# Patient Record
Sex: FEMALE | Race: WHITE | URBAN - METROPOLITAN AREA
[De-identification: names, ages, dates, MRNs, and addresses within clinical notes are randomized per-mention and may not be internally consistent; named-entity substitution may affect disease eponyms.]

---

## 2017-05-29 ENCOUNTER — OFFICE VISIT (OUTPATIENT)
Dept: URGENT CARE | Facility: URGENT CARE | Age: 57
End: 2017-05-29
Payer: COMMERCIAL

## 2017-05-29 VITALS
SYSTOLIC BLOOD PRESSURE: 142 MMHG | WEIGHT: 165.4 LBS | HEART RATE: 97 BPM | OXYGEN SATURATION: 97 % | DIASTOLIC BLOOD PRESSURE: 92 MMHG | TEMPERATURE: 98 F

## 2017-05-29 DIAGNOSIS — M54.50 ACUTE MIDLINE LOW BACK PAIN WITHOUT SCIATICA: Primary | ICD-10-CM

## 2017-05-29 DIAGNOSIS — R03.0 ELEVATED BLOOD PRESSURE READING WITHOUT DIAGNOSIS OF HYPERTENSION: ICD-10-CM

## 2017-05-29 PROCEDURE — 99204 OFFICE O/P NEW MOD 45 MIN: CPT | Performed by: NURSE PRACTITIONER

## 2017-05-29 RX ORDER — HYDROCODONE BITARTRATE AND ACETAMINOPHEN 5; 325 MG/1; MG/1
1 TABLET ORAL EVERY 8 HOURS PRN
Qty: 10 TABLET | Refills: 0 | Status: SHIPPED | OUTPATIENT
Start: 2017-05-29 | End: 2018-01-21

## 2017-05-29 RX ORDER — LISINOPRIL 10 MG/1
TABLET ORAL
Refills: 2 | COMMUNITY
Start: 2017-05-10

## 2017-05-29 RX ORDER — HYDROCODONE BITARTRATE AND ACETAMINOPHEN 5; 325 MG/1; MG/1
TABLET ORAL
Refills: 0 | COMMUNITY
Start: 2017-05-17 | End: 2017-05-29

## 2017-05-29 ASSESSMENT — PAIN SCALES - GENERAL: PAINLEVEL: WORST PAIN (10)

## 2017-05-29 NOTE — NURSING NOTE
Chief Complaint   Patient presents with     Back Pain       Initial BP (!) 160/120 (BP Location: Left arm, Patient Position: Chair, Cuff Size: Adult Regular)  Pulse 97  Temp 98  F (36.7  C) (Oral)  Wt 165 lb 6.4 oz (75 kg)  SpO2 97% There is no height or weight on file to calculate BMI.  Medication Reconciliation: complete     Gina Peña, CMA

## 2017-05-29 NOTE — MR AVS SNAPSHOT
"              After Visit Summary   5/29/2017    Yumiko Martines    MRN: [id number]           Patient Information     Date Of Birth          1960        Visit Information        Provider Department      5/29/2017 4:55 PM Lily Lambert APRN [facility]        Today's Diagnoses     Acute midline low back pain with sciatica    -  1      Care Instructions      * Sciatica    Sciatica (\"Lumbar Radiculopathy\") causes a pain that spreads from the lower back down into the buttock, hip and leg. Sometimes leg pain can occur without any back pain. Sciatica is due to irritation or pressure on a spinal nerve as it comes out of the spinal canal. This is most often due to a bulge or rupture of a nearby spinal disk (the cartilage cushion between each spinal bone), which presses on a nearby nerve. Other causes include spinal stenosis (narrowing of the spinal canal) and spasm of the piriformis muscle (a muscle in the buttocks that the sciatic nerve passes through).  Sciatica may begin after a sudden twisting/bending force (such as in a car accident), or sometimes after a simple awkward movement. In either case, muscle spasm is commonly present and contributes to the pain.  The diagnosis of sciatica is made from the symptoms and physical exam. Unless you had a physical injury (such as a car accident or fall), X-rays are usually not ordered for the initial evaluation of sciatica because the nerves and disks cannot be seen on an X-ray. Most sciatica (80-90%) gets better with time.  What can I do about my low back pain?  There are three main things you can do to ease low back pain and help it go away.    Use heat or cold packs.    Take medicine as directed.    Use positions, movements and exercises. Stay active! Too much rest can make your symptoms worse.  Using heat or cold packs  Try cold packs or gentle heat to ease your pain. Use whichever gives the most relief. Apply the cold pack or heat for 15 " minutes at a time, as often as needed.  Taking medicine  If taking over-the-counter medicine:    Take ibuprofen (Advil, Motrin) 600 mg. three times a day as needed for pain.  OR    Take Aleve (naproxen sodium) 220 to 440 mg. two times a day as needed for pain  If your doctor prescribed a muscle relaxant (cyclobenzapine 10 mg.):    Take one half ( ) to 1 tablet at bedtime    Do not drive when taking this medicine. This drug may make you sleepy.  Using positions, movements and exercises  Research tells us that moving your joints and muscles can help you recover from back pain. Such activity should be simple and gentle.  Use the positions below as well as walking to help relieve your discomfort. Try taking a short walk every 3 to 4 hours during the day. Walk for a few minutes inside your home or take longer walks outside, on a treadmill or at a mall. Slowly increase the amount of time you walk. Expect discomfort when you begin, but it should lessen as your back starts to recover.  Finding a position that is comfortable  When your back pain is new, you may find that certain positions will ease your pain. Gently try each of the following positions until you find one that eases your pain. Once you find a position of comfort, use it as often as you like while you recover. Return to your daily routine as soon as possible.     Lie on your back with your legs bent. You can do this by placing a pillow under your knees or lie on the floor and rest your lower legs on the seat of a chair.    Lie on your side with your knees bent and place a pillow between your knees.    Lie on your stomach over pillows.  When should I call my doctor?  Your back pain should improve over the first couple of weeks. As it improves, you should be able to return to your normal activities. But call your doctor if:    You have a sudden change in your ability to control? your bladder or bowels.    You begin to feel tingling in your groin or legs.    The  "pain spreads down your leg and into your foot.    Your toes, feet or leg muscles begin to feel weak.    You feel generally unwell or sick.    Your pain gets worse.    9767-0745 [employer]. [address] [place] [address], [place] [address]. All rights reserved. This information is not intended as a substitute for professional medical care. Always follow your healthcare professional's instructions.                Follow-ups after your visit        Who to contact     If you have questions or need follow up information about today's clinic visit or your schedule please contact [facility] directly at [phone].  Normal or non-critical lab and imaging results will be communicated to you by [employer], letter or phone within 4 business days after the clinic has received the results. If you do not hear from us within 7 days, please contact the clinic through [employer] or phone. If you have a critical or abnormal lab result, we will notify you by phone as soon as possible.  Submit refill requests through [employer] or call your pharmacy and they will forward the refill request to us. Please allow 3 business days for your refill to be completed.          Additional Information About Your Visit        [employer] Information     [employer] lets you send messages to your doctor, view your test results, renew your prescriptions, schedule appointments and more. To sign up, go to [email or web address] . Click on \"Log in\" on the left side of the screen, which will take you to the Welcome page. Then click on \"Sign up Now\" on the right side of the page.     You will be asked to enter the access code listed below, as well as some personal information. Please follow the directions to create your username and password.     Your access code is: [email or web address]  Expires: 2017  5:42 PM     Your access code will  in 90 days. If you need help or a new code, please call your [facility] or [phone].      "   Care EveryWhere ID     This is your Care EveryWhere ID. This could be used by other organizations to access your [facility] medical records  [id number]        Your Vitals Were     Pulse Temperature Pulse Oximetry             97 98  F (36.7  C) (Oral) 97%          Blood Pressure from Last 3 Encounters:   05/29/17 (!) 160/120    Weight from Last 3 Encounters:   05/29/17 165 lb 6.4 oz (75 kg)              Today, you had the following     No orders found for display         Today's Medication Changes          These changes are accurate as of: 5/29/17  5:42 PM.  If you have any questions, ask your nurse or doctor.               These medicines have changed or have updated prescriptions.        Dose/Directions    HYDROcodone-acetaminophen 5-325 MG per tablet   Commonly known as:  NORCO   This may have changed:  See the new instructions.   Used for:  Acute midline low back pain without sciatica   Changed by:  Lily Lambert APRN CNP        Dose:  1 tablet   Take 1 tablet by mouth every 8 hours as needed for moderate to severe pain   Quantity:  10 tablet   Refills:  0            Where to get your medicines      Some of these will need a paper prescription and others can be bought over the counter.  Ask your nurse if you have questions.     Bring a paper prescription for each of these medications     HYDROcodone-acetaminophen 5-325 MG per tablet                Primary Care Provider    None Specified       No primary provider on file.        Thank you!     Thank you for choosing [facility]  for your care. Our goal is always to provide you with excellent care. Hearing back from our patients is one way we can continue to improve our services. Please take a few minutes to complete the written survey that you may receive in the mail after your visit with us. Thank you!             Your Updated Medication List - Protect others around you: Learn how to safely use, store and throw away your medicines at  www.disposemymeds.org.          This list is accurate as of: 5/29/17  5:42 PM.  Always use your most recent med list.                   Brand Name Dispense Instructions for use    HYDROcodone-acetaminophen 5-325 MG per tablet    NORCO    10 tablet    Take 1 tablet by mouth every 8 hours as needed for moderate to severe pain       lisinopril 10 MG tablet    PRINIVIL/ZESTRIL     TAKE 1 TABLET BY MOUTH DAILY.

## 2017-05-29 NOTE — PATIENT INSTRUCTIONS
"  * Sciatica    Sciatica (\"Lumbar Radiculopathy\") causes a pain that spreads from the lower back down into the buttock, hip and leg. Sometimes leg pain can occur without any back pain. Sciatica is due to irritation or pressure on a spinal nerve as it comes out of the spinal canal. This is most often due to a bulge or rupture of a nearby spinal disk (the cartilage cushion between each spinal bone), which presses on a nearby nerve. Other causes include spinal stenosis (narrowing of the spinal canal) and spasm of the piriformis muscle (a muscle in the buttocks that the sciatic nerve passes through).  Sciatica may begin after a sudden twisting/bending force (such as in a car accident), or sometimes after a simple awkward movement. In either case, muscle spasm is commonly present and contributes to the pain.  The diagnosis of sciatica is made from the symptoms and physical exam. Unless you had a physical injury (such as a car accident or fall), X-rays are usually not ordered for the initial evaluation of sciatica because the nerves and disks cannot be seen on an X-ray. Most sciatica (80-90%) gets better with time.  What can I do about my low back pain?  There are three main things you can do to ease low back pain and help it go away.    Use heat or cold packs.    Take medicine as directed.    Use positions, movements and exercises. Stay active! Too much rest can make your symptoms worse.  Using heat or cold packs  Try cold packs or gentle heat to ease your pain. Use whichever gives the most relief. Apply the cold pack or heat for 15 minutes at a time, as often as needed.  Taking medicine  If taking over-the-counter medicine:    Take ibuprofen (Advil, Motrin) 600 mg. three times a day as needed for pain.  OR    Take Aleve (naproxen sodium) 220 to 440 mg. two times a day as needed for pain  If your doctor prescribed a muscle relaxant (cyclobenzapine 10 mg.):    Take one half ( ) to 1 tablet at bedtime    Do not drive when " taking this medicine. This drug may make you sleepy.  Using positions, movements and exercises  Research tells us that moving your joints and muscles can help you recover from back pain. Such activity should be simple and gentle.  Use the positions below as well as walking to help relieve your discomfort. Try taking a short walk every 3 to 4 hours during the day. Walk for a few minutes inside your home or take longer walks outside, on a treadmill or at a mall. Slowly increase the amount of time you walk. Expect discomfort when you begin, but it should lessen as your back starts to recover.  Finding a position that is comfortable  When your back pain is new, you may find that certain positions will ease your pain. Gently try each of the following positions until you find one that eases your pain. Once you find a position of comfort, use it as often as you like while you recover. Return to your daily routine as soon as possible.     Lie on your back with your legs bent. You can do this by placing a pillow under your knees or lie on the floor and rest your lower legs on the seat of a chair.    Lie on your side with your knees bent and place a pillow between your knees.    Lie on your stomach over pillows.  When should I call my doctor?  Your back pain should improve over the first couple of weeks. As it improves, you should be able to return to your normal activities. But call your doctor if:    You have a sudden change in your ability to control? your bladder or bowels.    You begin to feel tingling in your groin or legs.    The pain spreads down your leg and into your foot.    Your toes, feet or leg muscles begin to feel weak.    You feel generally unwell or sick.    Your pain gets worse.    9512-3022 [employer]. [address] [place] [address], [place] [address]. All rights reserved. This information is not intended as a substitute for professional medical care. Always follow your healthcare professional's  instructions.

## 2017-05-29 NOTE — PROGRESS NOTES
SUBJECTIVE:                                                    Yumiko Martines is a 57 year old female who presents to clinic today for the following health issues:    Back Pain      Duration: 3 days        Specific cause: Spinal injection on 5/26/17 to relieve spinal stenosis     Description:   Location of pain: low back right  Character of pain: sharp, burning and constant  Pain radiation:radiates into the right foot and neck  New numbness or weakness in legs, not attributed to pain:  no     Intensity: Currently 9/10    History:   Pain interferes with job: YES  History of back problems: no prior back problems  Any previous MRI or X-rays: Yes--at [facility].  Date 5/2/17  Sees a specialist for back pain:  NoNo  Therapies tried without relief: acetaminophen (Tylenol), cold, heat, NSAIDs, Physical Therapy and rest    Alleviating factors:   Improved by: hot baths and Physical Therapy      Precipitating factors:  Worsened by: Lifting, Bending, Standing, Sitting, Lying Flat and Walking    Functional and Psychosocial Screen (Abdelrahman STarT Back):      Not performed today       Accompanying Signs & Symptoms:  Risk of Fracture:  None  Risk of Cauda Equina:  None  Risk of Infection:  None  Risk of Cancer:  None  Risk of Ankylosing Spondylitis:  Onset at age <35, male, AND morning back stiffness. no     She had epidural steroid injection L5-S1 done Friday, said it would take 3-5 days before it kicked in to help her feel better. She is just on day 3 and no relief, just ran out of pain medication. Has been taking norco 1 every 8 hours prn for pain.        Problem list and histories reviewed & adjusted, as indicated.  Additional history: as documented    There is no problem list on file for this patient.    No past surgical history on file.    Social History   Substance Use Topics     Smoking status: Never Smoker     Smokeless tobacco: Not on file     Alcohol use Not on file     No family history on file.        Reviewed and  "updated as needed this visit by clinical staff       Reviewed and updated as needed this visit by Provider         ROS:  Constitutional, HEENT, cardiovascular, pulmonary, GI, , musculoskeletal, neuro, skin, endocrine and psych systems are negative, except as otherwise noted.    OBJECTIVE:                                                    BP (!) 160/120 (BP Location: Left arm, Patient Position: Chair, Cuff Size: Adult Regular)  Pulse 97  Temp 98  F (36.7  C) (Oral)  Wt 165 lb 6.4 oz (75 kg)  SpO2 97%  There is no height or weight on file to calculate BMI.  GENERAL:  Alert, appears uncomfortable in pain  RESP: lungs clear to auscultation - no rales, rhonchi or wheezes  CV: regular rate and rhythm, normal S1 S2, no S3 or S4, no murmur, click or rub, no peripheral edema and peripheral pulses strong  SKIN: no suspicious lesions or rashes  BACK: l5-s1 tender, Does not have full range of motion, has sciatica down right leg.       ASSESSMENT/PLAN:                                                      1. Acute midline low back pain with sciatica  Discussed giving pain management for tonight but will need to follow up with pcp tomorrow and should consider pain contract for pain management.    - HYDROcodone-acetaminophen (NORCO) 5-325 MG per tablet; Take 1 tablet by mouth every 8 hours as needed for moderate to severe pain  Dispense: 10 tablet; Refill: 0    Patient education with home care given as well  Monitor symptoms, call or rtc if worsening or not improving.     2. Elevated blood pressure reading without diagnosis of hypertension  Rechecked 142/92. Discussed probably elevated due to pain but advised diet and to monitor blood pressure. Follow up if continues to be elevated or if significantly high warned of hypertensive crisis.     See Patient Instructions  Patient Instructions     * Sciatica    Sciatica (\"Lumbar Radiculopathy\") causes a pain that spreads from the lower back down into the buttock, hip and leg. " Sometimes leg pain can occur without any back pain. Sciatica is due to irritation or pressure on a spinal nerve as it comes out of the spinal canal. This is most often due to a bulge or rupture of a nearby spinal disk (the cartilage cushion between each spinal bone), which presses on a nearby nerve. Other causes include spinal stenosis (narrowing of the spinal canal) and spasm of the piriformis muscle (a muscle in the buttocks that the sciatic nerve passes through).  Sciatica may begin after a sudden twisting/bending force (such as in a car accident), or sometimes after a simple awkward movement. In either case, muscle spasm is commonly present and contributes to the pain.  The diagnosis of sciatica is made from the symptoms and physical exam. Unless you had a physical injury (such as a car accident or fall), X-rays are usually not ordered for the initial evaluation of sciatica because the nerves and disks cannot be seen on an X-ray. Most sciatica (80-90%) gets better with time.  What can I do about my low back pain?  There are three main things you can do to ease low back pain and help it go away.    Use heat or cold packs.    Take medicine as directed.    Use positions, movements and exercises. Stay active! Too much rest can make your symptoms worse.  Using heat or cold packs  Try cold packs or gentle heat to ease your pain. Use whichever gives the most relief. Apply the cold pack or heat for 15 minutes at a time, as often as needed.  Taking medicine  If taking over-the-counter medicine:    Take ibuprofen (Advil, Motrin) 600 mg. three times a day as needed for pain.  OR    Take Aleve (naproxen sodium) 220 to 440 mg. two times a day as needed for pain  If your doctor prescribed a muscle relaxant (cyclobenzapine 10 mg.):    Take one half ( ) to 1 tablet at bedtime    Do not drive when taking this medicine. This drug may make you sleepy.  Using positions, movements and exercises  Research tells us that moving your  joints and muscles can help you recover from back pain. Such activity should be simple and gentle.  Use the positions below as well as walking to help relieve your discomfort. Try taking a short walk every 3 to 4 hours during the day. Walk for a few minutes inside your home or take longer walks outside, on a treadmill or at a mall. Slowly increase the amount of time you walk. Expect discomfort when you begin, but it should lessen as your back starts to recover.  Finding a position that is comfortable  When your back pain is new, you may find that certain positions will ease your pain. Gently try each of the following positions until you find one that eases your pain. Once you find a position of comfort, use it as often as you like while you recover. Return to your daily routine as soon as possible.     Lie on your back with your legs bent. You can do this by placing a pillow under your knees or lie on the floor and rest your lower legs on the seat of a chair.    Lie on your side with your knees bent and place a pillow between your knees.    Lie on your stomach over pillows.  When should I call my doctor?  Your back pain should improve over the first couple of weeks. As it improves, you should be able to return to your normal activities. But call your doctor if:    You have a sudden change in your ability to control? your bladder or bowels.    You begin to feel tingling in your groin or legs.    The pain spreads down your leg and into your foot.    Your toes, feet or leg muscles begin to feel weak.    You feel generally unwell or sick.    Your pain gets worse.    2684-1372 [employer]. [address] [place] [address], [place] [address]. All rights reserved. This information is not intended as a substitute for professional medical care. Always follow your healthcare professional's instructions.            NICHOLAS Camarena [facility]

## 2017-08-28 ENCOUNTER — OFFICE VISIT (OUTPATIENT)
Dept: URGENT CARE | Facility: URGENT CARE | Age: 57
End: 2017-08-28
Payer: COMMERCIAL

## 2017-08-28 VITALS
TEMPERATURE: 98.2 F | SYSTOLIC BLOOD PRESSURE: 140 MMHG | WEIGHT: 175.6 LBS | HEART RATE: 72 BPM | DIASTOLIC BLOOD PRESSURE: 96 MMHG | OXYGEN SATURATION: 95 %

## 2017-08-28 DIAGNOSIS — M48.062 SPINAL STENOSIS OF LUMBAR REGION WITH NEUROGENIC CLAUDICATION: Primary | ICD-10-CM

## 2017-08-28 DIAGNOSIS — I10 ELEVATED BLOOD PRESSURE READING IN OFFICE WITH DIAGNOSIS OF HYPERTENSION: ICD-10-CM

## 2017-08-28 PROCEDURE — 99214 OFFICE O/P EST MOD 30 MIN: CPT | Performed by: PHYSICIAN ASSISTANT

## 2017-08-28 RX ORDER — GABAPENTIN 300 MG/1
300 CAPSULE ORAL
COMMUNITY
Start: 2017-05-31 | End: 2018-02-20

## 2017-08-28 RX ORDER — HYDROCODONE BITARTRATE AND ACETAMINOPHEN 5; 325 MG/1; MG/1
1 TABLET ORAL EVERY 6 HOURS PRN
Qty: 15 TABLET | Refills: 0 | Status: SHIPPED | OUTPATIENT
Start: 2017-08-28

## 2017-08-28 ASSESSMENT — PAIN SCALES - GENERAL: PAINLEVEL: WORST PAIN (10)

## 2017-08-28 NOTE — PROGRESS NOTES
SUBJECTIVE:   Yumiko Martines is a 57 year old female who presents to clinic today for the following health issues:      Back Pain       Duration: 2 weeks        Specific cause: none    Description:   Location of pain: low back right  Character of pain: sharp  Pain radiation:radiates into the right leg  New numbness or weakness in legs, not attributed to pain:  YES    Intensity: Currently 10/10    History:   Pain interferes with job: YES,   History of back problems: previous spinal stenosis - 05/2017  Any previous MRI or X-rays: Yes--at  [facility]  Date 03/2017 and 05/2017  Sees a specialist for back pain:  Physical therapy  Therapies tried without relief: acetaminophen (Tylenol), physical therapy    Alleviating factors:   Improved by: none      Precipitating factors:      Worsened by: Walking      Has appt for f/u at [facility] tomorrow but could not wait due to the pain. Has spinal stenosis. Had MARY in her back 5/2017. Finished Physical Therapy 2 weeks ago which did help but pain became worse 3 days ago. Has neurogenic claudication. Gets R LE radicular if she even walks to the mail box. No bowel/bladder trouble.     Seen in  5/2017, note reviewed    No Known Allergies    No past medical history on file.      Current Outpatient Prescriptions on File Prior to Visit:  lisinopril (PRINIVIL/ZESTRIL) 10 MG tablet TAKE 1 TABLET BY MOUTH DAILY.   HYDROcodone-acetaminophen (NORCO) 5-325 MG per tablet Take 1 tablet by mouth every 8 hours as needed for moderate to severe pain (Patient not taking: Reported on 8/28/2017)     No current facility-administered medications on file prior to visit.     No past surgical history on file.    Social History     Social History     Marital status:      Spouse name: N/A     Number of children: N/A     Years of education: N/A     Occupational History     Not on file.     Social History Main Topics     Smoking status: Never Smoker     Smokeless tobacco: Not on  file     Alcohol use Not on file     Drug use: Not on file     Sexual activity: Not on file     Other Topics Concern     Not on file     Social History Narrative       REVIEW OF SYSTEMS  General: negative for fever  Resp: negative for chest pain   CV: negative for chest pain  : negative for dysuria , incontinence, frequency  Musculoskeletal: as above  Neurologic: negative for ataxia, saddle anesthesia, fecal incontinence, one sided weakness,  paresthesias    Physical Exam:  Vitals: BP (!) 155/104 (BP Location: Left arm, Patient Position: Chair, Cuff Size: Adult Regular)  Pulse 72  Temp 98.2  F (36.8  C) (Oral)  Wt 79.7 kg (175 lb 9.6 oz)  SpO2 95%. Recheck /96  BMI= There is no height or weight on file to calculate BMI.  Constitutional: healthy, alert and no acute distress   CARDIO: RRR, no MRG  RESP: lungs CTA, NAD  NEURO: Patellar reflexes intact and equal b/l  BACK:  Straight leg raise intact with LBP only.  Tender L5- S1 with paraspinal muscle TTP, strength intact and equal b/l lower extremities  GAIT: intact  Psychiatric: mentation appears normal and affect normal/bright      Impression:     ICD-10-CM    1. Spinal stenosis of lumbar region with neurogenic claudication M48.06 HYDROcodone-acetaminophen (NORCO) 5-325 MG per tablet   2. Elevated blood pressure reading in office with diagnosis of hypertension I10          Plan: Explained she will no longer get pain meds through [facility]. She must follow up with her MD at [facility].  Instructions for back care and return precautions discussed.  See PCP about BP      Charlotte Sheridan PA-C

## 2017-08-28 NOTE — MR AVS SNAPSHOT
"              After Visit Summary   2017    Yumiko Martines    MRN: [id number]           Patient Information     Date Of Birth          1960        Visit Information        Provider Department      2017 1:25 PM Charlotte Sheridan PA-C [facility]        Today's Diagnoses     Spinal stenosis of lumbar region with neurogenic claudication    -  1    Elevated blood pressure reading without diagnosis of hypertension           Follow-ups after your visit        Who to contact     If you have questions or need follow up information about today's clinic visit or your schedule please contact [facility] directly at [phone].  Normal or non-critical lab and imaging results will be communicated to you by Needlehart, letter or phone within 4 business days after the clinic has received the results. If you do not hear from us within 7 days, please contact the clinic through Needlehart or phone. If you have a critical or abnormal lab result, we will notify you by phone as soon as possible.  Submit refill requests through BoldIQ or call your pharmacy and they will forward the refill request to us. Please allow 3 business days for your refill to be completed.          Additional Information About Your Visit        MyChart Information     BoldIQ lets you send messages to your doctor, view your test results, renew your prescriptions, schedule appointments and more. To sign up, go to [email or web address] . Click on \"Log in\" on the left side of the screen, which will take you to the Welcome page. Then click on \"Sign up Now\" on the right side of the page.     You will be asked to enter the access code listed below, as well as some personal information. Please follow the directions to create your username and password.     Your access code is: [email or web address]  Expires: 2017  2:19 PM     Your access code will  in 90 days. If you need help or a new code, please call your " [facility] or [phone].        Care EveryWhere ID     This is your Care EveryWhere ID. This could be used by other organizations to access your [facility] medical records  [id number]        Your Vitals Were     Pulse Temperature Pulse Oximetry             72 98.2  F (36.8  C) (Oral) 95%          Blood Pressure from Last 3 Encounters:   08/28/17 (!) 155/104   05/29/17 (!) 142/92    Weight from Last 3 Encounters:   08/28/17 175 lb 9.6 oz (79.7 kg)   05/29/17 165 lb 6.4 oz (75 kg)              Today, you had the following     No orders found for display         Today's Medication Changes          These changes are accurate as of: 8/28/17  2:19 PM.  If you have any questions, ask your nurse or doctor.               These medicines have changed or have updated prescriptions.        Dose/Directions    * HYDROcodone-acetaminophen 5-325 MG per tablet   Commonly known as:  NORCO   This may have changed:  Another medication with the same name was added. Make sure you understand how and when to take each.   Used for:  Acute midline low back pain without sciatica   Changed by:  Lily Lambert APRN CNP        Dose:  1 tablet   Take 1 tablet by mouth every 8 hours as needed for moderate to severe pain   Quantity:  10 tablet   Refills:  0       * HYDROcodone-acetaminophen 5-325 MG per tablet   Commonly known as:  NORCO   This may have changed:  You were already taking a medication with the same name, and this prescription was added. Make sure you understand how and when to take each.   Used for:  Spinal stenosis of lumbar region with neurogenic claudication   Changed by:  Charlotte Sheridan PA-C        Dose:  1 tablet   Take 1 tablet by mouth every 6 hours as needed for moderate to severe pain   Quantity:  15 tablet   Refills:  0       * Notice:  This list has 2 medication(s) that are the same as other medications prescribed for you. Read the directions carefully, and ask your doctor or other care provider to review  them with you.         Where to get your medicines      Some of these will need a paper prescription and others can be bought over the counter.  Ask your nurse if you have questions.     Bring a paper prescription for each of these medications     HYDROcodone-acetaminophen 5-325 MG per tablet                Primary Care Provider    None Specified       No primary provider on file.        Equal Access to Services     LOUANN YEPEZ : Erik brar Sodavid, waaxda luqadaha, qaybta kaalmada rc, gideon mandel. So [facility] [phone].    ATENCIÓN: Si habla español, tiene a coleman disposición servicios gratuitos de asistencia lingüística. Kang al [phone].    We comply with applicable federal civil rights laws and [place] laws. We do not discriminate on the basis of race, color, national origin, age, disability sex, sexual orientation or gender identity.            Thank you!     Thank you for choosing [facility]  for your care. Our goal is always to provide you with excellent care. Hearing back from our patients is one way we can continue to improve our services. Please take a few minutes to complete the written survey that you may receive in the mail after your visit with us. Thank you!             Your Updated Medication List - Protect others around you: Learn how to safely use, store and throw away your medicines at www.disposemymeds.org.          This list is accurate as of: 8/28/17  2:19 PM.  Always use your most recent med list.                   Brand Name Dispense Instructions for use Diagnosis    * GABAPENTIN PO      Take by mouth daily        * gabapentin 300 MG capsule    NEURONTIN     Take 300 mg by mouth        * HYDROcodone-acetaminophen 5-325 MG per tablet    NORCO    10 tablet    Take 1 tablet by mouth every 8 hours as needed for moderate to severe pain    Acute midline low back pain without sciatica       * HYDROcodone-acetaminophen 5-325 MG  per tablet    NORCO    15 tablet    Take 1 tablet by mouth every 6 hours as needed for moderate to severe pain    Spinal stenosis of lumbar region with neurogenic claudication       lisinopril 10 MG tablet    PRINIVIL/ZESTRIL     TAKE 1 TABLET BY MOUTH DAILY.        * Notice:  This list has 4 medication(s) that are the same as other medications prescribed for you. Read the directions carefully, and ask your doctor or other care provider to review them with you.

## 2017-08-28 NOTE — NURSING NOTE
Chief Complaint   Patient presents with     Back Pain       Initial BP (!) 155/104 (BP Location: Left arm, Patient Position: Chair, Cuff Size: Adult Regular)  Pulse 72  Temp 98.2  F (36.8  C) (Oral)  Wt 175 lb 9.6 oz (79.7 kg)  SpO2 95% There is no height or weight on file to calculate BMI.  Medication Reconciliation: complete         Romi Kerns

## 2018-01-21 ENCOUNTER — RADIANT APPOINTMENT (OUTPATIENT)
Dept: GENERAL RADIOLOGY | Facility: CLINIC | Age: 58
End: 2018-01-21
Attending: FAMILY MEDICINE
Payer: COMMERCIAL

## 2018-01-21 ENCOUNTER — OFFICE VISIT (OUTPATIENT)
Dept: URGENT CARE | Facility: URGENT CARE | Age: 58
End: 2018-01-21
Payer: COMMERCIAL

## 2018-01-21 VITALS
TEMPERATURE: 97 F | SYSTOLIC BLOOD PRESSURE: 149 MMHG | DIASTOLIC BLOOD PRESSURE: 103 MMHG | HEART RATE: 88 BPM | WEIGHT: 175 LBS | OXYGEN SATURATION: 100 %

## 2018-01-21 DIAGNOSIS — J06.9 UPPER RESPIRATORY TRACT INFECTION, UNSPECIFIED TYPE: ICD-10-CM

## 2018-01-21 DIAGNOSIS — R05.9 COUGH: ICD-10-CM

## 2018-01-21 DIAGNOSIS — R07.1 PAINFUL RESPIRATION: ICD-10-CM

## 2018-01-21 DIAGNOSIS — R07.1 PAINFUL RESPIRATION: Primary | ICD-10-CM

## 2018-01-21 PROCEDURE — 99213 OFFICE O/P EST LOW 20 MIN: CPT | Performed by: FAMILY MEDICINE

## 2018-01-21 PROCEDURE — 71046 X-RAY EXAM CHEST 2 VIEWS: CPT | Mod: FY

## 2018-01-21 RX ORDER — BENZONATATE 100 MG/1
100-200 CAPSULE ORAL 3 TIMES DAILY PRN
Qty: 50 CAPSULE | Refills: 0 | Status: SHIPPED | OUTPATIENT
Start: 2018-01-21 | End: 2018-02-20

## 2018-01-21 RX ORDER — METHOCARBAMOL 500 MG/1
500 TABLET, FILM COATED ORAL
COMMUNITY
Start: 2017-11-30

## 2018-01-21 RX ORDER — PREGABALIN 75 MG
CAPSULE ORAL
Refills: 0 | COMMUNITY
Start: 2017-11-10

## 2018-01-21 RX ORDER — BENZONATATE 100 MG/1
100-200 CAPSULE ORAL 3 TIMES DAILY PRN
Qty: 50 CAPSULE | Refills: 0 | Status: SHIPPED | OUTPATIENT
Start: 2018-01-21 | End: 2018-01-21

## 2018-01-21 NOTE — MR AVS SNAPSHOT
"              After Visit Summary   2018    Yumiko Martines    MRN: [id number]           Patient Information     Date Of Birth          1960        Visit Information        Provider Department      2018 9:25 AM Aquilino Roberts MD [facility]        Today's Diagnoses     Painful respiration    -  1    Cough        Upper respiratory tract infection, unspecified type           Follow-ups after your visit        Who to contact     If you have questions or need follow up information about today's clinic visit or your schedule please contact [facility] directly at [phone].  Normal or non-critical lab and imaging results will be communicated to you by YASSSUhart, letter or phone within 4 business days after the clinic has received the results. If you do not hear from us within 7 days, please contact the clinic through YASSSUhart or phone. If you have a critical or abnormal lab result, we will notify you by phone as soon as possible.  Submit refill requests through [employer] or call your pharmacy and they will forward the refill request to us. Please allow 3 business days for your refill to be completed.          Additional Information About Your Visit        MyChart Information     Applied Micro[employer]ures lets you send messages to your doctor, view your test results, renew your prescriptions, schedule appointments and more. To sign up, go to [email or web address] . Click on \"Log in\" on the left side of the screen, which will take you to the Welcome page. Then click on \"Sign up Now\" on the right side of the page.     You will be asked to enter the access code listed below, as well as some personal information. Please follow the directions to create your username and password.     Your access code is: [email or web address]  Expires: 2018 11:09 AM     Your access code will  in 90 days. If you need help or a new code, please call your [facility] or [phone].        Care " EveryWhere ID     This is your Care EveryWhere ID. This could be used by other organizations to access your [facility] medical records  [id number]        Your Vitals Were     Pulse Temperature Pulse Oximetry             88 97  F (36.1  C) (Oral) 100%          Blood Pressure from Last 3 Encounters:   01/21/18 (!) 149/103   08/28/17 (!) 140/96   05/29/17 (!) 142/92    Weight from Last 3 Encounters:   01/21/18 175 lb (79.4 kg)   08/28/17 175 lb 9.6 oz (79.7 kg)   05/29/17 165 lb 6.4 oz (75 kg)                 Today's Medication Changes          These changes are accurate as of: 1/21/18 11:09 AM.  If you have any questions, ask your nurse or doctor.               Start taking these medicines.        Dose/Directions    benzonatate 100 MG capsule   Commonly known as:  TESSALON   Used for:  Upper respiratory tract infection, unspecified type   Started by:  Aquilino Roberts MD        Dose:  100-200 mg   Take 1-2 capsules (100-200 mg) by mouth 3 times daily as needed for cough   Quantity:  50 capsule   Refills:  0         These medicines have changed or have updated prescriptions.        Dose/Directions    HYDROcodone-acetaminophen 5-325 MG per tablet   Commonly known as:  NORCO   This may have changed:  Another medication with the same name was removed. Continue taking this medication, and follow the directions you see here.   Used for:  Spinal stenosis of lumbar region with neurogenic claudication   Changed by:  Charlotte Sheridan PA-C        Dose:  1 tablet   Take 1 tablet by mouth every 6 hours as needed for moderate to severe pain   Quantity:  15 tablet   Refills:  0            Where to get your medicines      These medications were sent to [facility] [place], [place] - [address]  [address], [facility] [address]     Phone:  [phone]     benzonatate 100 MG capsule                Primary Care Provider Fax #    Physician No Ref-Primary [phone]       No address on file         Equal Access to Services     [place]ern ValleyFELECIA : Hadii aad ku hadleiavicky Jhali, waaxda luqadaha, qaybta kamalikagideon yanes. So [facility] [place] [facility] [phone].    ATENCIÓN: Si habla español, tiene a coleman disposición servicios gratuitos de asistencia lingüística. Corettaame al [phone].    We comply with applicable federal civil rights laws and [place] laws. We do not discriminate on the basis of race, color, national origin, age, disability, sex, sexual orientation, or gender identity.            Thank you!     Thank you for choosing [facility]  for your care. Our goal is always to provide you with excellent care. Hearing back from our patients is one way we can continue to improve our services. Please take a few minutes to complete the written survey that you may receive in the mail after your visit with us. Thank you!             Your Updated Medication List - Protect others around you: Learn how to safely use, store and throw away your medicines at www.disposemymeds.org.          This list is accurate as of: 1/21/18 11:09 AM.  Always use your most recent med list.                   Brand Name Dispense Instructions for use Diagnosis    benzonatate 100 MG capsule    TESSALON    50 capsule    Take 1-2 capsules (100-200 mg) by mouth 3 times daily as needed for cough    Upper respiratory tract infection, unspecified type       * GABAPENTIN PO      Take by mouth daily        * gabapentin 300 MG capsule    NEURONTIN     Take 300 mg by mouth        HYDROcodone-acetaminophen 5-325 MG per tablet    NORCO    15 tablet    Take 1 tablet by mouth every 6 hours as needed for moderate to severe pain    Spinal stenosis of lumbar region with neurogenic claudication       lisinopril 10 MG tablet    PRINIVIL/ZESTRIL     TAKE 1 TABLET BY MOUTH DAILY.        LYRICA 75 MG capsule   Generic drug:  pregabalin      TAKE 1 CAP BY MOUTH TWICE A DAY FOR 7 DAYS THEN 2 CAPS TWICE DAILY X 21 DAYS         methocarbamol 500 MG tablet    ROBAXIN     Take 500 mg by mouth        * Notice:  This list has 2 medication(s) that are the same as other medications prescribed for you. Read the directions carefully, and ask your doctor or other care provider to review them with you.

## 2018-01-21 NOTE — PROGRESS NOTES
Yumiko Martines with presents with 8-9 days symptoms including sore throat, congestion, post nasal drip, productive cough, without fever, now hard to breath because of left sided rib pain.     The patient has a history of chronic pain. Had massage yesterday.     Treatment measures tried include lyrica and robaxin for chronic. Tylenol. No cold symptoms   exposure to simlar symptoms in coworkers and family   No  recent travel     Current Outpatient Prescriptions   Medication Sig Dispense Refill     LYRICA 75 MG capsule TAKE 1 CAP BY MOUTH TWICE A DAY FOR 7 DAYS THEN 2 CAPS TWICE DAILY X 21 DAYS  0     methocarbamol (ROBAXIN) 500 MG tablet Take 500 mg by mouth       GABAPENTIN PO Take by mouth daily       gabapentin (NEURONTIN) 300 MG capsule Take 300 mg by mouth       HYDROcodone-acetaminophen (NORCO) 5-325 MG per tablet Take 1 tablet by mouth every 6 hours as needed for moderate to severe pain 15 tablet 0     lisinopril (PRINIVIL/ZESTRIL) 10 MG tablet TAKE 1 TABLET BY MOUTH DAILY.  2       ROS otherwise negative for resp., ID,  HEENT symptoms.    Objective: BP (!) 149/103 (BP Location: Left arm, Patient Position: Chair, Cuff Size: Adult Regular)  Pulse 88  Temp 97  F (36.1  C) (Oral)  Wt 175 lb (79.4 kg)  SpO2 100%  Exam:  GENERAL APPEARANCE: healthy, alert and no distress  EYES: Eyes grossly normal to inspection  HENT: ear canals and TM's normal and nose and mouth without ulcers or lesions  NECK: no adenopathy, no asymmetry, masses, or scars and thyroid normal to palpation  RESP: lungs clear to auscultation - no rales, rhonchi or wheezes  CV: regular rates and rhythm, normal S1 S2, no S3 or S4 and no murmur, click or rub -     cxr  Normal but shallow inspiration      ICD-10-CM    1. Painful respiration R07.1 XR Chest 2 Views   2. Cough R05 XR Chest 2 Views   3. Upper respiratory tract infection, unspecified type J06.9 benzonatate (TESSALON) 100 MG capsule     DISCONTINUED: benzonatate (TESSALON) 100 MG capsule      likely rib strain along with viral uri. Symptomatic therapy and follow up discussed.

## 2018-01-21 NOTE — NURSING NOTE
Chief Complaint   Patient presents with     Cough     Patient complains of cough and rib pain       Initial BP (!) 149/103 (BP Location: Left arm, Patient Position: Chair, Cuff Size: Adult Regular)  Pulse 88  Temp 97  F (36.1  C) (Oral)  Wt 175 lb (79.4 kg)  SpO2 100% There is no height or weight on file to calculate BMI.  Medication Reconciliation: complete       Romi Kerns

## 2018-02-20 ENCOUNTER — OFFICE VISIT (OUTPATIENT)
Dept: URGENT CARE | Facility: URGENT CARE | Age: 58
End: 2018-02-20
Payer: COMMERCIAL

## 2018-02-20 VITALS
TEMPERATURE: 97.2 F | SYSTOLIC BLOOD PRESSURE: 140 MMHG | HEART RATE: 78 BPM | DIASTOLIC BLOOD PRESSURE: 88 MMHG | OXYGEN SATURATION: 100 %

## 2018-02-20 DIAGNOSIS — L08.9: Primary | ICD-10-CM

## 2018-02-20 DIAGNOSIS — L97.911: Primary | ICD-10-CM

## 2018-02-20 PROCEDURE — 99213 OFFICE O/P EST LOW 20 MIN: CPT | Performed by: FAMILY MEDICINE

## 2018-02-20 RX ORDER — SULFAMETHOXAZOLE/TRIMETHOPRIM 800-160 MG
1 TABLET ORAL 2 TIMES DAILY
Qty: 20 TABLET | Refills: 0 | Status: SHIPPED | OUTPATIENT
Start: 2018-02-20 | End: 2018-03-02

## 2018-02-20 ASSESSMENT — PAIN SCALES - GENERAL: PAINLEVEL: SEVERE PAIN (6)

## 2018-02-20 NOTE — MR AVS SNAPSHOT
After Visit Summary   2/20/2018    Yumiko Martines    MRN: [id number]           Patient Information     Date Of Birth          1960        Visit Information        Provider Department      2/20/2018 5:35 PM Prabhu Weiss MD [facility]        Today's Diagnoses     Infected traumatic leg ulcer, right, limited to breakdown of skin (H)    -  1      Care Instructions    Take full course of antibiotic.      Cellulitis  Cellulitis is an infection of the deep layers of skin. A break in the skin, such as a cut or scratch, can let bacteria under the skin. If the bacteria get to deep layers of the skin, it can be serious. If not treated, cellulitis can get into the bloodstream and lymph nodes. The infection can then spread throughout the body. This causes serious illness.  Cellulitis causes the affected skin to become red, swollen, warm, and sore. The reddened areas have a visible border. An open sore may leak fluid (pus). You may have a fever, chills, and pain.  Cellulitis is treated with antibiotics taken for 7 to 10 days. An open sore may be cleaned and covered with cool wet gauze. Symptoms should get better 1 to 2 days after treatment is started. Make sure to take all the antibiotics for the full number of days until they are gone. Keep taking the medicine even if your symptoms go away.  Home care  Follow these tips:    Limit the use of the part of your body with cellulitis.     If the infection is on your leg, keep your leg raised while sitting. This will help to reduce swelling.    Take all of the antibiotic medicine exactly as directed until it is gone. Do not miss any doses, especially during the first 7 days. Don t stop taking the medicine when your symptoms get better.    Keep the affected area clean and dry.    Wash your hands with soap and warm water before and after touching your skin. Anyone else who touches your skin should also wash his or her hands. Don't share  "towels.  Follow-up care  Follow up with your healthcare provider, or as advised. If your infection does not go away on the first antibiotic, your healthcare provider will prescribe a different one.  When to seek medical advice  Call your healthcare provider right away if any of these occur:    Red areas that spread    Swelling or pain that gets worse    Fluid leaking from the skin (pus)    Fever higher of 100.4  F (38.0  C) or higher after 2 days on antibiotics  Date Last Reviewed: 9/1/2016 2000-2017 [employer]. [address] [place] [address]. All rights reserved. This information is not intended as a substitute for professional medical care. Always follow your healthcare professional's instructions.                Follow-ups after your visit        Who to contact     If you have questions or need follow up information about today's clinic visit or your schedule please contact [facility] directly at [phone].  Normal or non-critical lab and imaging results will be communicated to you by MyChart, letter or phone within 4 business days after the clinic has received the results. If you do not hear from us within 7 days, please contact the clinic through [facility] or phone. If you have a critical or abnormal lab result, we will notify you by phone as soon as possible.  Submit refill requests through Placed or call your pharmacy and they will forward the refill request to us. Please allow 3 business days for your refill to be completed.          Additional Information About Your Visit        Placed Information     Placed lets you send messages to your doctor, view your test results, renew your prescriptions, schedule appointments and more. To sign up, go to [email or web address] . Click on \"Log in\" on the left side of the screen, which will take you to the Welcome page. Then click on \"Sign up Now\" on the right side of the page.     You will be asked to enter the " access code listed below, as well as some personal information. Please follow the directions to create your username and password.     Your access code is: [email or web address]  Expires: 2018 11:09 AM     Your access code will  in 90 days. If you need help or a new code, please call your [facility]) or [phone].        Care EveryWhere ID     This is your Care EveryWhere ID. This could be used by other organizations to access your [place] medical records  [id number]        Your Vitals Were     Pulse Temperature Pulse Oximetry             78 97.2  F (36.2  C) (Oral) 100%          Blood Pressure from Last 3 Encounters:   18 140/88   18 (!) 149/103   17 (!) 140/96    Weight from Last 3 Encounters:   18 175 lb (79.4 kg)   17 175 lb 9.6 oz (79.7 kg)   17 165 lb 6.4 oz (75 kg)              Today, you had the following     No orders found for display         Today's Medication Changes          These changes are accurate as of 18  7:05 PM.  If you have any questions, ask your nurse or doctor.               Start taking these medicines.        Dose/Directions    sulfamethoxazole-trimethoprim 800-160 MG per tablet   Commonly known as:  BACTRIM DS/SEPTRA DS   Used for:  Infected traumatic leg ulcer, right, limited to breakdown of skin (H)   Started by:  Prabhu Weiss MD        Dose:  1 tablet   Take 1 tablet by mouth 2 times daily for 10 days   Quantity:  20 tablet   Refills:  0            Where to get your medicines      These medications were sent to [id number] [address] [address]     Phone:  [phone]     sulfamethoxazole-trimethoprim 800-160 MG per tablet                Primary Care Provider Fax #    Physician No Ref-Primary [phone]       No address on file        Equal Access to Services     LOUANN YEPEZ AH: Erik Winkler, dia loredo, flip tatum,  gideon cedeno jennifer delgado'aan ah. So [facility] [phone].    ATENCIÓN: Si cecilio fernandez, tiene a coleman disposición servicios gratuitos de asistencia lingüística. Kang rosario [phone].    We comply with applicable federal civil rights laws and [place] laws. We do not discriminate on the basis of race, color, national origin, age, disability, sex, sexual orientation, or gender identity.            Thank you!     Thank you for choosing [facility]  for your care. Our goal is always to provide you with excellent care. Hearing back from our patients is one way we can continue to improve our services. Please take a few minutes to complete the written survey that you may receive in the mail after your visit with us. Thank you!             Your Updated Medication List - Protect others around you: Learn how to safely use, store and throw away your medicines at www.disposemymeds.org.          This list is accurate as of 2/20/18  7:05 PM.  Always use your most recent med list.                   Brand Name Dispense Instructions for use Diagnosis    HYDROcodone-acetaminophen 5-325 MG per tablet    NORCO    15 tablet    Take 1 tablet by mouth every 6 hours as needed for moderate to severe pain    Spinal stenosis of lumbar region with neurogenic claudication       lisinopril 10 MG tablet    PRINIVIL/ZESTRIL     TAKE 1 TABLET BY MOUTH DAILY.        LYRICA 75 MG capsule   Generic drug:  pregabalin      TAKE 1 CAP BY MOUTH TWICE A DAY FOR 7 DAYS THEN 2 CAPS TWICE DAILY X 21 DAYS        methocarbamol 500 MG tablet    ROBAXIN     Take 500 mg by mouth        sulfamethoxazole-trimethoprim 800-160 MG per tablet    BACTRIM DS/SEPTRA DS    20 tablet    Take 1 tablet by mouth 2 times daily for 10 days    Infected traumatic leg ulcer, right, limited to breakdown of skin (H)

## 2018-02-21 NOTE — PROGRESS NOTES
SUBJECTIVE:  Chief Complaint   Patient presents with     Abscess     right leg     Yumiko Martines is a 57 year old female presents with a chief complaint of right leg pain.    Patient with history of spinal stenosis, has been struggling with chronic pain, leg swelling.  Patient recalls that on 12/25 that hit the right lower leg on furniture, did sustain a bruise but this did not cause any break in the skin.  Patient has been struggling with more leg swelling and about 1 month ago, due to leg swelling causing tenseness, the wound broke open.  Since then, has been very slow healing and patient is now worried that has developed an infection.  No fever.  No itchiness.  Endorse mild discomfort, is applying topical ointment    Patient with similar problems in the past, developed wound and abscess, had to have left lower leg grafted due to infection, has sustained laceration on right lower leg requiring sutures in same area of current wound.  Patient has seen wound clinic 3 times in the past for poor wound healing.  No diabetes.    No past medical history on file.  Current Outpatient Prescriptions   Medication Sig Dispense Refill     LYRICA 75 MG capsule TAKE 1 CAP BY MOUTH TWICE A DAY FOR 7 DAYS THEN 2 CAPS TWICE DAILY X 21 DAYS  0     methocarbamol (ROBAXIN) 500 MG tablet Take 500 mg by mouth       lisinopril (PRINIVIL/ZESTRIL) 10 MG tablet TAKE 1 TABLET BY MOUTH DAILY.  2     HYDROcodone-acetaminophen (NORCO) 5-325 MG per tablet Take 1 tablet by mouth every 6 hours as needed for moderate to severe pain (Patient not taking: Reported on 2/20/2018) 15 tablet 0     Social History   Substance Use Topics     Smoking status: Never Smoker     Smokeless tobacco: Never Used     Alcohol use No       ROS:  Review of systems negative except as stated above.    EXAM:   /88 (BP Location: Left arm, Patient Position: Chair, Cuff Size: Adult Large)  Pulse 78  Temp 97.2  F (36.2  C) (Oral)  SpO2 100%  Gen: healthy,alert,no  distress  Extremity: right lower leg has oval ulceration on middle of anterior lower leg with mild surrounding erythema.  Yellowish tissue on wound, no overt drainage.   There is not compromise to the distal circulation.  Pulses are +2 and CRT is brisk  PSYCH: alert, affect bright    X-RAY was not done.    ASSESSMENT/PLAN:   (L97.911,  L08.9) Infected traumatic leg ulcer, right, limited to breakdown of skin (H)  (primary encounter diagnosis)  Plan: sulfamethoxazole-trimethoprim (BACTRIM         DS/SEPTRA DS) 800-160 MG per tablet            Slow wound healing, empiric treatment with Bactrim DS for coverage for wound infection.  Encourage to elevate leg to minimize swelling.      Follow up with primary for recheck, may need to be seen by wound clinic for further care.    Prabhu Weiss MD  February 20, 2018 9:19 PM

## 2018-02-21 NOTE — NURSING NOTE
Chief Complaint   Patient presents with     Abscess     right leg       Initial /88 (BP Location: Left arm, Patient Position: Chair, Cuff Size: Adult Large)  Pulse 78  Temp 97.2  F (36.2  C) (Oral)  SpO2 100% There is no height or weight on file to calculate BMI.  Medication Reconciliation: complete   Annette Lyles CMA

## 2018-02-21 NOTE — PATIENT INSTRUCTIONS
Take full course of antibiotic.      Cellulitis  Cellulitis is an infection of the deep layers of skin. A break in the skin, such as a cut or scratch, can let bacteria under the skin. If the bacteria get to deep layers of the skin, it can be serious. If not treated, cellulitis can get into the bloodstream and lymph nodes. The infection can then spread throughout the body. This causes serious illness.  Cellulitis causes the affected skin to become red, swollen, warm, and sore. The reddened areas have a visible border. An open sore may leak fluid (pus). You may have a fever, chills, and pain.  Cellulitis is treated with antibiotics taken for 7 to 10 days. An open sore may be cleaned and covered with cool wet gauze. Symptoms should get better 1 to 2 days after treatment is started. Make sure to take all the antibiotics for the full number of days until they are gone. Keep taking the medicine even if your symptoms go away.  Home care  Follow these tips:    Limit the use of the part of your body with cellulitis.     If the infection is on your leg, keep your leg raised while sitting. This will help to reduce swelling.    Take all of the antibiotic medicine exactly as directed until it is gone. Do not miss any doses, especially during the first 7 days. Don t stop taking the medicine when your symptoms get better.    Keep the affected area clean and dry.    Wash your hands with soap and warm water before and after touching your skin. Anyone else who touches your skin should also wash his or her hands. Don't share towels.  Follow-up care  Follow up with your healthcare provider, or as advised. If your infection does not go away on the first antibiotic, your healthcare provider will prescribe a different one.  When to seek medical advice  Call your healthcare provider right away if any of these occur:    Red areas that spread    Swelling or pain that gets worse    Fluid leaking from the skin (pus)    Fever higher of 100.4  F  (38.0  C) or higher after 2 days on antibiotics  Date Last Reviewed: 9/1/2016 2000-2017 [employer]. [address] [place] [address], [place] [address]. All rights reserved. This information is not intended as a substitute for professional medical care. Always follow your healthcare professional's instructions.

## 2025-07-24 ENCOUNTER — LAB REQUISITION (OUTPATIENT)
Dept: LAB | Facility: CLINIC | Age: 65
End: 2025-07-24
Payer: COMMERCIAL

## 2025-07-24 DIAGNOSIS — D72.828 OTHER ELEVATED WHITE BLOOD CELL COUNT: ICD-10-CM

## 2025-07-24 DIAGNOSIS — N17.8 OTHER ACUTE KIDNEY FAILURE: ICD-10-CM

## 2025-07-25 LAB
ANION GAP SERPL CALCULATED.3IONS-SCNC: 11 MMOL/L (ref 7–15)
BASOPHILS # BLD AUTO: 0 10E3/UL (ref 0–0.2)
BASOPHILS NFR BLD AUTO: 0 %
BUN SERPL-MCNC: 20.6 MG/DL (ref 8–23)
CALCIUM SERPL-MCNC: 9.2 MG/DL (ref 8.8–10.4)
CHLORIDE SERPL-SCNC: 101 MMOL/L (ref 98–107)
CREAT SERPL-MCNC: 0.79 MG/DL (ref 0.51–0.95)
EGFRCR SERPLBLD CKD-EPI 2021: 83 ML/MIN/1.73M2
EOSINOPHIL # BLD AUTO: 0.1 10E3/UL (ref 0–0.7)
EOSINOPHIL NFR BLD AUTO: 1 %
ERYTHROCYTE [DISTWIDTH] IN BLOOD BY AUTOMATED COUNT: 16.3 % (ref 10–15)
GLUCOSE SERPL-MCNC: 99 MG/DL (ref 70–99)
HCO3 SERPL-SCNC: 27 MMOL/L (ref 22–29)
HCT VFR BLD AUTO: 40.3 % (ref 35–47)
HGB BLD-MCNC: 12.8 G/DL (ref 11.7–15.7)
IMM GRANULOCYTES # BLD: 0.3 10E3/UL
IMM GRANULOCYTES NFR BLD: 3 %
LYMPHOCYTES # BLD AUTO: 0.9 10E3/UL (ref 0.8–5.3)
LYMPHOCYTES NFR BLD AUTO: 8 %
MCH RBC QN AUTO: 30.3 PG (ref 26.5–33)
MCHC RBC AUTO-ENTMCNC: 31.8 G/DL (ref 31.5–36.5)
MCV RBC AUTO: 96 FL (ref 78–100)
MONOCYTES # BLD AUTO: 0.7 10E3/UL (ref 0–1.3)
MONOCYTES NFR BLD AUTO: 7 %
NEUTROPHILS # BLD AUTO: 8.7 10E3/UL (ref 1.6–8.3)
NEUTROPHILS NFR BLD AUTO: 81 %
NRBC # BLD AUTO: 0 10E3/UL
NRBC BLD AUTO-RTO: 0 /100
PLATELET # BLD AUTO: 304 10E3/UL (ref 150–450)
POTASSIUM SERPL-SCNC: 3.3 MMOL/L (ref 3.4–5.3)
RBC # BLD AUTO: 4.22 10E6/UL (ref 3.8–5.2)
SODIUM SERPL-SCNC: 139 MMOL/L (ref 135–145)
WBC # BLD AUTO: 10.7 10E3/UL (ref 4–11)

## 2025-07-25 PROCEDURE — P9604 ONE-WAY ALLOW PRORATED TRIP: HCPCS | Mod: ORL | Performed by: FAMILY MEDICINE

## 2025-07-25 PROCEDURE — 85025 COMPLETE CBC W/AUTO DIFF WBC: CPT | Mod: ORL | Performed by: FAMILY MEDICINE

## 2025-07-25 PROCEDURE — 80048 BASIC METABOLIC PNL TOTAL CA: CPT | Mod: ORL | Performed by: FAMILY MEDICINE

## 2025-07-25 PROCEDURE — 36415 COLL VENOUS BLD VENIPUNCTURE: CPT | Mod: ORL | Performed by: FAMILY MEDICINE

## 2025-08-01 ENCOUNTER — LAB REQUISITION (OUTPATIENT)
Dept: LAB | Facility: CLINIC | Age: 65
End: 2025-08-01
Payer: COMMERCIAL

## 2025-08-01 DIAGNOSIS — E87.6 HYPOKALEMIA: ICD-10-CM

## 2025-08-04 LAB — POTASSIUM SERPL-SCNC: 3.9 MMOL/L (ref 3.4–5.3)

## 2025-08-04 PROCEDURE — 84132 ASSAY OF SERUM POTASSIUM: CPT | Mod: ORL | Performed by: FAMILY MEDICINE

## 2025-08-04 PROCEDURE — 36415 COLL VENOUS BLD VENIPUNCTURE: CPT | Mod: ORL | Performed by: FAMILY MEDICINE

## 2025-08-04 PROCEDURE — P9604 ONE-WAY ALLOW PRORATED TRIP: HCPCS | Mod: ORL | Performed by: FAMILY MEDICINE

## 2025-08-13 ENCOUNTER — DOCUMENTATION ONLY (OUTPATIENT)
Dept: OTHER | Facility: CLINIC | Age: 65
End: 2025-08-13
Payer: COMMERCIAL